# Patient Record
Sex: MALE | NOT HISPANIC OR LATINO | Employment: FULL TIME | URBAN - METROPOLITAN AREA
[De-identification: names, ages, dates, MRNs, and addresses within clinical notes are randomized per-mention and may not be internally consistent; named-entity substitution may affect disease eponyms.]

---

## 2018-09-13 ENCOUNTER — OFFICE VISIT (OUTPATIENT)
Dept: CARDIOLOGY | Facility: CLINIC | Age: 33
End: 2018-09-13

## 2018-09-13 VITALS
WEIGHT: 182 LBS | HEIGHT: 67 IN | HEART RATE: 71 BPM | BODY MASS INDEX: 28.56 KG/M2 | DIASTOLIC BLOOD PRESSURE: 78 MMHG | SYSTOLIC BLOOD PRESSURE: 116 MMHG

## 2018-09-13 DIAGNOSIS — E78.5 HYPERLIPIDEMIA, UNSPECIFIED HYPERLIPIDEMIA TYPE: ICD-10-CM

## 2018-09-13 DIAGNOSIS — R07.2 PRECORDIAL PAIN: Primary | ICD-10-CM

## 2018-09-13 DIAGNOSIS — R94.31 ABNORMAL EKG: ICD-10-CM

## 2018-09-13 DIAGNOSIS — R53.83 FATIGUE, UNSPECIFIED TYPE: ICD-10-CM

## 2018-09-13 PROCEDURE — 99203 OFFICE O/P NEW LOW 30 MIN: CPT | Performed by: INTERNAL MEDICINE

## 2018-09-13 NOTE — PROGRESS NOTES
" Subjective:        CC  CP  WAKE UP IN MIDDLE OF NIGHT, VOMITTING, AFTER ALCOHOL, PALPITATION    WHOLE BOTTLE OF WINE         Rik Mckinley is a 32 y.o. male who is here for the cardiac evaluation as well as establishment of care as the patient is complaining of the chest pain retrosternal recently  woke up in middle of night associated with the vomiting after the alcohol consumption associated with palpitations and also    The chest as well as moderate in intensity    Patient had consumed a whole bottle of wine Cardiac risk factors: family history of premature cardiovascular disease, obesity (BMI >= 30 kg/m2) and smoking/ tobacco exposure.    Past Medical History:   Diagnosis Date   • Hyperlipidemia     reports that he has been smoking Cigars.  He has smoked for the past 2.00 years. He has never used smokeless tobacco. He reports that he drinks alcohol. He reports that he does not use drugs.  Family History   Problem Relation Age of Onset   • Hypertension Father    • Hyperlipidemia Father    • Heart failure Maternal Grandfather    • Heart attack Maternal Grandfather    • Heart disease Maternal Grandfather         Review of Systems  Constitutional: No wt loss, fever   Gastrointestinal: No nausea , abdominal pain  Behavioral/Psych: No insomnia or anxiety   Cardiovascular ----positive for CP. All other systems reviewed and are negative    Objective:                 Physical Exam  /78   Pulse 71   Ht 170.2 cm (67\")   Wt 82.6 kg (182 lb)   BMI 28.51 kg/m²     General appearance: NAD, conversant   Eyes: anicteric sclerae, moist conjunctivae; no lid-lag; PERRLA   HENT: Atraumatic; oropharynx clear with moist mucous membranes and no mucosal ulcerations;  normal hard and soft palate   Neck: Trachea midline; FROM, supple, no thyromegaly or lymphadenopathy   Lungs: CTA, with normal respiratory effort and no intercostal retractions   CV: S1-S2 regular, no murmurs, no rub, no gallop   Abdomen: Soft, non-tender; no " masses or HSM   Extremities: No peripheral edema or extremity lymphadenopathy  Skin: Normal temperature, turgor and texture; no rash, ulcers or subcutaneous nodules   Psych: Appropriate affect, alert and oriented to person, place and time           Cardiographics  @Procedures    Echocardiogram:     Imaging  Chest x-ray: not done     Lab Review   not applicable       Current Outpatient Prescriptions:   •  metFORMIN (GLUCOPHAGE) 500 MG tablet, Take 500 mg by mouth Daily., Disp: , Rfl:         Assessment:      1. Precordial pain    2. Abnormal EKG    3. Fatigue, unspecified type    4. Hyperlipidemia, unspecified hyperlipidemia type        There is no problem list on file for this patient.        Plan:          1. Precordial pain  Considering the patient's symptoms as well as clinical situation and  EKG findings, along with cardiac risk factors, ischemic workup is necessary to rule out ischemic cardiomyopathy, stress induced arrhythmias, and functional capacity for diagnosis as well as prognostic consideration    - Treadmill Stress Test  - Adult Transthoracic Echo Complete W/ Cont if Necessary Per Protocol; Future  - CBC & Differential  - Comprehensive Metabolic Panel  - Lipid Panel  - Thyroid Panel With TSH  - Vitamin D 25 Hydroxy  - CBC Auto Differential    2. Abnormal EKG  Considering patient's medical condition as well as the risk factors, patient will require echocardiogram for further evaluation for the LV function, four-chamber evaluation, including the pressures, valvular function and  pericardial disease and pericardial effusion    - Treadmill Stress Test  - Adult Transthoracic Echo Complete W/ Cont if Necessary Per Protocol; Future  - CBC & Differential  - Comprehensive Metabolic Panel  - Lipid Panel  - Thyroid Panel With TSH  - Vitamin D 25 Hydroxy  - CBC Auto Differential    3. Fatigue, unspecified type  Multifactorial  - Treadmill Stress Test  - Adult Transthoracic Echo Complete W/ Cont if Necessary Per  Protocol; Future  - CBC & Differential  - Comprehensive Metabolic Panel  - Lipid Panel  - Thyroid Panel With TSH  - Vitamin D 25 Hydroxy  - CBC Auto Differential    4. Hyperlipidemia, unspecified hyperlipidemia type  Counseling has been done  - Treadmill Stress Test  - Adult Transthoracic Echo Complete W/ Cont if Necessary Per Protocol; Future  - CBC & Differential  - Comprehensive Metabolic Panel  - Lipid Panel  - Thyroid Panel With TSH  - Vitamin D 25 Hydroxy  - CBC Auto Differential      ETT/ ECHO, CBC, CMP, FLP, TSH    SEE IN 2-4 WKS        Counseling was given to Rik Mckinley for the following topics:  risk factor reductions, prognosis and risks and benefits of treatment options .       SMOKING COUNSELING:    Ready to quit: Not Answered  Counseling given: Yes      .  EMR Dragon/Transcription disclaimer:   Much of this encounter note is an electronic transcription/translation of spoken language to printed text. The electronic translation of spoken language may permit erroneous, or at times, nonsensical words or phrases to be inadvertently transcribed; Although I have reviewed the note for such errors, some may still exist.

## 2018-09-19 ENCOUNTER — HOSPITAL ENCOUNTER (OUTPATIENT)
Dept: CARDIOLOGY | Facility: HOSPITAL | Age: 33
Discharge: HOME | End: 2018-09-19
Attending: INTERNAL MEDICINE | Admitting: INTERNAL MEDICINE

## 2018-09-19 VITALS
HEART RATE: 85 BPM | BODY MASS INDEX: 28.56 KG/M2 | HEIGHT: 67 IN | SYSTOLIC BLOOD PRESSURE: 106 MMHG | WEIGHT: 182 LBS | DIASTOLIC BLOOD PRESSURE: 68 MMHG

## 2018-09-19 LAB
BH CV STRESS BP STAGE 1: NORMAL
BH CV STRESS BP STAGE 2: NORMAL
BH CV STRESS BP STAGE 3: NORMAL
BH CV STRESS DURATION MIN STAGE 1: 3
BH CV STRESS DURATION MIN STAGE 2: 3
BH CV STRESS DURATION MIN STAGE 3: 3
BH CV STRESS DURATION MIN STAGE 4: 1
BH CV STRESS DURATION SEC STAGE 1: 0
BH CV STRESS DURATION SEC STAGE 2: 0
BH CV STRESS DURATION SEC STAGE 3: 0
BH CV STRESS DURATION SEC STAGE 4: 30
BH CV STRESS GRADE STAGE 1: 10
BH CV STRESS GRADE STAGE 2: 12
BH CV STRESS GRADE STAGE 3: 14
BH CV STRESS GRADE STAGE 4: 16
BH CV STRESS HR STAGE 1: 107
BH CV STRESS HR STAGE 2: 138
BH CV STRESS HR STAGE 3: 164
BH CV STRESS HR STAGE 4: 183
BH CV STRESS METS STAGE 1: 4.6
BH CV STRESS METS STAGE 2: 7.1
BH CV STRESS METS STAGE 3: 10.2
BH CV STRESS METS STAGE 4: 11.1
BH CV STRESS PROTOCOL 1: NORMAL
BH CV STRESS RECOVERY BP: NORMAL MMHG
BH CV STRESS RECOVERY HR: 96 BPM
BH CV STRESS SPEED STAGE 1: 1.7
BH CV STRESS SPEED STAGE 2: 2.5
BH CV STRESS SPEED STAGE 3: 3.4
BH CV STRESS SPEED STAGE 4: 4.2
BH CV STRESS STAGE 1: 1
BH CV STRESS STAGE 2: 2
BH CV STRESS STAGE 3: 3
BH CV STRESS STAGE 4: 4
MAXIMAL PREDICTED HEART RATE: 188 BPM
PERCENT MAX PREDICTED HR: 98.4 %
STRESS BASELINE BP: NORMAL MMHG
STRESS BASELINE HR: 73 BPM
STRESS PERCENT HR: 116 %
STRESS POST ESTIMATED WORKLOAD: 11.2 METS
STRESS POST EXERCISE DUR MIN: 10 MIN
STRESS POST EXERCISE DUR SEC: 30 SEC
STRESS POST PEAK BP: NORMAL MMHG
STRESS POST PEAK HR: 185 BPM
STRESS TARGET HR: 160 BPM

## 2018-09-19 PROCEDURE — 93018 CV STRESS TEST I&R ONLY: CPT | Performed by: INTERNAL MEDICINE

## 2018-09-19 PROCEDURE — 93017 CV STRESS TEST TRACING ONLY: CPT

## 2018-09-19 PROCEDURE — 93016 CV STRESS TEST SUPVJ ONLY: CPT | Performed by: INTERNAL MEDICINE

## 2018-09-21 ENCOUNTER — HOSPITAL ENCOUNTER (OUTPATIENT)
Dept: CARDIOLOGY | Facility: HOSPITAL | Age: 33
Discharge: HOME | End: 2018-09-21
Attending: INTERNAL MEDICINE | Admitting: INTERNAL MEDICINE

## 2018-09-21 ENCOUNTER — HOSPITAL ENCOUNTER (OUTPATIENT)
Dept: CARDIOLOGY | Facility: HOSPITAL | Age: 33
Discharge: HOME | End: 2018-09-21

## 2018-09-21 VITALS
HEART RATE: 62 BPM | WEIGHT: 182 LBS | HEIGHT: 67 IN | SYSTOLIC BLOOD PRESSURE: 116 MMHG | BODY MASS INDEX: 28.56 KG/M2 | DIASTOLIC BLOOD PRESSURE: 77 MMHG

## 2018-09-21 DIAGNOSIS — R07.2 PRECORDIAL PAIN: ICD-10-CM

## 2018-09-21 DIAGNOSIS — R53.83 FATIGUE, UNSPECIFIED TYPE: ICD-10-CM

## 2018-09-21 DIAGNOSIS — E78.5 HYPERLIPIDEMIA, UNSPECIFIED HYPERLIPIDEMIA TYPE: ICD-10-CM

## 2018-09-21 DIAGNOSIS — R94.31 ABNORMAL EKG: ICD-10-CM

## 2018-09-21 LAB
25(OH)D3 SERPL-MCNC: 19.4 NG/ML (ref 30–100)
ALBUMIN SERPL-MCNC: 4.6 G/DL (ref 3.5–5.2)
ALBUMIN/GLOB SERPL: 1.6 G/DL
ALP SERPL-CCNC: 56 U/L (ref 39–117)
ALT SERPL W P-5'-P-CCNC: 40 U/L (ref 1–41)
ANION GAP SERPL CALCULATED.3IONS-SCNC: 8.6 MMOL/L
AORTIC DIMENSIONLESS INDEX: 0.8 (DI)
AST SERPL-CCNC: 24 U/L (ref 1–40)
BASOPHILS # BLD AUTO: 0.01 10*3/MM3 (ref 0–0.2)
BASOPHILS NFR BLD AUTO: 0.2 % (ref 0–1.5)
BH CV ECHO MEAS - ACS: 2.4 CM
BH CV ECHO MEAS - AO MAX PG (FULL): 0.35 MMHG
BH CV ECHO MEAS - AO MAX PG: 5 MMHG
BH CV ECHO MEAS - AO MEAN PG (FULL): 1 MMHG
BH CV ECHO MEAS - AO MEAN PG: 3 MMHG
BH CV ECHO MEAS - AO ROOT AREA (BSA CORRECTED): 1.6
BH CV ECHO MEAS - AO ROOT AREA: 7.5 CM^2
BH CV ECHO MEAS - AO ROOT DIAM: 3.1 CM
BH CV ECHO MEAS - AO V2 MAX: 112 CM/SEC
BH CV ECHO MEAS - AO V2 MEAN: 73.7 CM/SEC
BH CV ECHO MEAS - AO V2 VTI: 25.3 CM
BH CV ECHO MEAS - ASC AORTA: 2.4 CM
BH CV ECHO MEAS - AVA(I,A): 3.2 CM^2
BH CV ECHO MEAS - AVA(I,D): 3.2 CM^2
BH CV ECHO MEAS - AVA(V,A): 4 CM^2
BH CV ECHO MEAS - AVA(V,D): 4 CM^2
BH CV ECHO MEAS - BSA(HAYCOCK): 2 M^2
BH CV ECHO MEAS - BSA: 1.9 M^2
BH CV ECHO MEAS - BZI_BMI: 28.5 KILOGRAMS/M^2
BH CV ECHO MEAS - BZI_METRIC_HEIGHT: 170.2 CM
BH CV ECHO MEAS - BZI_METRIC_WEIGHT: 82.6 KG
BH CV ECHO MEAS - EDV(CUBED): 103.8 ML
BH CV ECHO MEAS - EDV(MOD-SP2): 106 ML
BH CV ECHO MEAS - EDV(MOD-SP4): 87 ML
BH CV ECHO MEAS - EDV(TEICH): 102.4 ML
BH CV ECHO MEAS - EF(CUBED): 68.4 %
BH CV ECHO MEAS - EF(MOD-BP): 74 %
BH CV ECHO MEAS - EF(MOD-SP2): 70.8 %
BH CV ECHO MEAS - EF(MOD-SP4): 74.7 %
BH CV ECHO MEAS - EF(TEICH): 60 %
BH CV ECHO MEAS - ESV(CUBED): 32.8 ML
BH CV ECHO MEAS - ESV(MOD-SP2): 31 ML
BH CV ECHO MEAS - ESV(MOD-SP4): 22 ML
BH CV ECHO MEAS - ESV(TEICH): 41 ML
BH CV ECHO MEAS - FS: 31.9 %
BH CV ECHO MEAS - IVS/LVPW: 0.89
BH CV ECHO MEAS - IVSD: 0.8 CM
BH CV ECHO MEAS - LAT PEAK E' VEL: 15 CM/SEC
BH CV ECHO MEAS - LV DIASTOLIC VOL/BSA (35-75): 44.8 ML/M^2
BH CV ECHO MEAS - LV MASS(C)D: 132.3 GRAMS
BH CV ECHO MEAS - LV MASS(C)DI: 68.1 GRAMS/M^2
BH CV ECHO MEAS - LV MAX PG: 4.7 MMHG
BH CV ECHO MEAS - LV MEAN PG: 2 MMHG
BH CV ECHO MEAS - LV SYSTOLIC VOL/BSA (12-30): 11.3 ML/M^2
BH CV ECHO MEAS - LV V1 MAX: 108 CM/SEC
BH CV ECHO MEAS - LV V1 MEAN: 68.4 CM/SEC
BH CV ECHO MEAS - LV V1 VTI: 19.7 CM
BH CV ECHO MEAS - LVIDD: 4.7 CM
BH CV ECHO MEAS - LVIDS: 3.2 CM
BH CV ECHO MEAS - LVLD AP2: 8.1 CM
BH CV ECHO MEAS - LVLD AP4: 8.6 CM
BH CV ECHO MEAS - LVLS AP2: 6.6 CM
BH CV ECHO MEAS - LVLS AP4: 6.5 CM
BH CV ECHO MEAS - LVOT AREA (M): 4.2 CM^2
BH CV ECHO MEAS - LVOT AREA: 4.2 CM^2
BH CV ECHO MEAS - LVOT DIAM: 2.3 CM
BH CV ECHO MEAS - LVPWD: 0.9 CM
BH CV ECHO MEAS - MED PEAK E' VEL: 11 CM/SEC
BH CV ECHO MEAS - MV A DUR: 0.14 SEC
BH CV ECHO MEAS - MV A MAX VEL: 48.5 CM/SEC
BH CV ECHO MEAS - MV DEC SLOPE: 297 CM/SEC^2
BH CV ECHO MEAS - MV DEC TIME: 0.15 SEC
BH CV ECHO MEAS - MV E MAX VEL: 76.2 CM/SEC
BH CV ECHO MEAS - MV E/A: 1.6
BH CV ECHO MEAS - MV MAX PG: 1.9 MMHG
BH CV ECHO MEAS - MV MEAN PG: 1 MMHG
BH CV ECHO MEAS - MV P1/2T MAX VEL: 77.4 CM/SEC
BH CV ECHO MEAS - MV P1/2T: 76.3 MSEC
BH CV ECHO MEAS - MV V2 MAX: 68.9 CM/SEC
BH CV ECHO MEAS - MV V2 MEAN: 38.6 CM/SEC
BH CV ECHO MEAS - MV V2 VTI: 21.1 CM
BH CV ECHO MEAS - MVA P1/2T LCG: 2.8 CM^2
BH CV ECHO MEAS - MVA(P1/2T): 2.9 CM^2
BH CV ECHO MEAS - MVA(VTI): 3.9 CM^2
BH CV ECHO MEAS - PA ACC TIME: 0.12 SEC
BH CV ECHO MEAS - PA MAX PG (FULL): 1.8 MMHG
BH CV ECHO MEAS - PA MAX PG: 3.8 MMHG
BH CV ECHO MEAS - PA PR(ACCEL): 23.7 MMHG
BH CV ECHO MEAS - PA V2 MAX: 97.7 CM/SEC
BH CV ECHO MEAS - PULM A REVS DUR: 0.11 SEC
BH CV ECHO MEAS - PULM A REVS VEL: 22.2 CM/SEC
BH CV ECHO MEAS - PULM DIAS VEL: 43 CM/SEC
BH CV ECHO MEAS - PULM S/D: 1
BH CV ECHO MEAS - PULM SYS VEL: 43.5 CM/SEC
BH CV ECHO MEAS - PVA(V,A): 3 CM^2
BH CV ECHO MEAS - PVA(V,D): 3 CM^2
BH CV ECHO MEAS - QP/QS: 0.89
BH CV ECHO MEAS - RAP SYSTOLE: 3 MMHG
BH CV ECHO MEAS - RV MAX PG: 2 MMHG
BH CV ECHO MEAS - RV MEAN PG: 1 MMHG
BH CV ECHO MEAS - RV V1 MAX: 70.8 CM/SEC
BH CV ECHO MEAS - RV V1 MEAN: 51.9 CM/SEC
BH CV ECHO MEAS - RV V1 VTI: 17.6 CM
BH CV ECHO MEAS - RVOT AREA: 4.2 CM^2
BH CV ECHO MEAS - RVOT DIAM: 2.3 CM
BH CV ECHO MEAS - RVSP: 14.3 MMHG
BH CV ECHO MEAS - SI(AO): 98.3 ML/M^2
BH CV ECHO MEAS - SI(CUBED): 36.6 ML/M^2
BH CV ECHO MEAS - SI(LVOT): 42.1 ML/M^2
BH CV ECHO MEAS - SI(MOD-SP2): 38.6 ML/M^2
BH CV ECHO MEAS - SI(MOD-SP4): 33.5 ML/M^2
BH CV ECHO MEAS - SI(TEICH): 31.6 ML/M^2
BH CV ECHO MEAS - SV(AO): 191 ML
BH CV ECHO MEAS - SV(CUBED): 71.1 ML
BH CV ECHO MEAS - SV(LVOT): 81.8 ML
BH CV ECHO MEAS - SV(MOD-SP2): 75 ML
BH CV ECHO MEAS - SV(MOD-SP4): 65 ML
BH CV ECHO MEAS - SV(RVOT): 73.1 ML
BH CV ECHO MEAS - SV(TEICH): 61.4 ML
BH CV ECHO MEAS - TAPSE (>1.6): 2.3 CM2
BH CV ECHO MEAS - TR MAX VEL: 168 CM/SEC
BH CV ECHO MEASUREMENTS AVERAGE E/E' RATIO: 5.86
BH CV VAS BP RIGHT ARM: NORMAL MMHG
BH CV XLRA - RV BASE: 3.2 CM
BH CV XLRA - TDI S': 11 CM/SEC
BILIRUB SERPL-MCNC: 0.9 MG/DL (ref 0.1–1.2)
BUN BLD-MCNC: 15 MG/DL (ref 6–20)
BUN/CREAT SERPL: 15.2 (ref 7–25)
CALCIUM SPEC-SCNC: 9.5 MG/DL (ref 8.6–10.5)
CHLORIDE SERPL-SCNC: 102 MMOL/L (ref 98–107)
CHOLEST SERPL-MCNC: 156 MG/DL (ref 0–200)
CO2 SERPL-SCNC: 28.4 MMOL/L (ref 22–29)
CREAT BLD-MCNC: 0.99 MG/DL (ref 0.76–1.27)
DEPRECATED RDW RBC AUTO: 45.9 FL (ref 37–54)
EOSINOPHIL # BLD AUTO: 0.11 10*3/MM3 (ref 0–0.7)
EOSINOPHIL NFR BLD AUTO: 2 % (ref 0.3–6.2)
ERYTHROCYTE [DISTWIDTH] IN BLOOD BY AUTOMATED COUNT: 13.8 % (ref 11.5–14.5)
GFR SERPL CREATININE-BSD FRML MDRD: 106 ML/MIN/1.73
GFR SERPL CREATININE-BSD FRML MDRD: 88 ML/MIN/1.73
GLOBULIN UR ELPH-MCNC: 2.9 GM/DL
GLUCOSE BLD-MCNC: 94 MG/DL (ref 65–99)
HCT VFR BLD AUTO: 41.9 % (ref 40.4–52.2)
HDLC SERPL-MCNC: 54 MG/DL (ref 40–60)
HGB BLD-MCNC: 13.9 G/DL (ref 13.7–17.6)
IMM GRANULOCYTES # BLD: 0.01 10*3/MM3 (ref 0–0.03)
IMM GRANULOCYTES NFR BLD: 0.2 % (ref 0–0.5)
LDLC SERPL CALC-MCNC: 95 MG/DL (ref 0–100)
LDLC/HDLC SERPL: 1.75 {RATIO}
LEFT ATRIUM VOLUME INDEX: 18.7 ML/M2
LYMPHOCYTES # BLD AUTO: 2.16 10*3/MM3 (ref 0.9–4.8)
LYMPHOCYTES NFR BLD AUTO: 39.5 % (ref 19.6–45.3)
MAXIMAL PREDICTED HEART RATE: 188 BPM
MCH RBC QN AUTO: 30.3 PG (ref 27–32.7)
MCHC RBC AUTO-ENTMCNC: 33.2 G/DL (ref 32.6–36.4)
MCV RBC AUTO: 91.3 FL (ref 79.8–96.2)
MONOCYTES # BLD AUTO: 0.48 10*3/MM3 (ref 0.2–1.2)
MONOCYTES NFR BLD AUTO: 8.8 % (ref 5–12)
NEUTROPHILS # BLD AUTO: 2.71 10*3/MM3 (ref 1.9–8.1)
NEUTROPHILS NFR BLD AUTO: 49.5 % (ref 42.7–76)
PLATELET # BLD AUTO: 270 10*3/MM3 (ref 140–500)
PMV BLD AUTO: 10.8 FL (ref 6–12)
POTASSIUM BLD-SCNC: 4.8 MMOL/L (ref 3.5–5.2)
PROT SERPL-MCNC: 7.5 G/DL (ref 6–8.5)
RBC # BLD AUTO: 4.59 10*6/MM3 (ref 4.6–6)
SODIUM BLD-SCNC: 139 MMOL/L (ref 136–145)
STRESS TARGET HR: 160 BPM
T-UPTAKE NFR SERPL: 1.06 TBI (ref 0.8–1.3)
T4 SERPL-MCNC: 6.79 MCG/DL (ref 4.5–11.7)
TRIGL SERPL-MCNC: 37 MG/DL (ref 0–150)
TSH SERPL DL<=0.05 MIU/L-ACNC: 1.66 MIU/ML (ref 0.27–4.2)
VLDLC SERPL-MCNC: 7.4 MG/DL (ref 5–40)
WBC NRBC COR # BLD: 5.47 10*3/MM3 (ref 4.5–10.7)

## 2018-09-21 PROCEDURE — 84443 ASSAY THYROID STIM HORMONE: CPT | Performed by: INTERNAL MEDICINE

## 2018-09-21 PROCEDURE — 84479 ASSAY OF THYROID (T3 OR T4): CPT | Performed by: INTERNAL MEDICINE

## 2018-09-21 PROCEDURE — 84436 ASSAY OF TOTAL THYROXINE: CPT | Performed by: INTERNAL MEDICINE

## 2018-09-21 PROCEDURE — 80053 COMPREHEN METABOLIC PANEL: CPT | Performed by: INTERNAL MEDICINE

## 2018-09-21 PROCEDURE — 80061 LIPID PANEL: CPT | Performed by: INTERNAL MEDICINE

## 2018-09-21 PROCEDURE — 93306 TTE W/DOPPLER COMPLETE: CPT

## 2018-09-21 PROCEDURE — 36415 COLL VENOUS BLD VENIPUNCTURE: CPT | Performed by: INTERNAL MEDICINE

## 2018-09-21 PROCEDURE — 93306 TTE W/DOPPLER COMPLETE: CPT | Performed by: INTERNAL MEDICINE

## 2018-09-21 PROCEDURE — 82306 VITAMIN D 25 HYDROXY: CPT | Performed by: INTERNAL MEDICINE

## 2018-09-21 PROCEDURE — 85025 COMPLETE CBC W/AUTO DIFF WBC: CPT | Performed by: INTERNAL MEDICINE

## 2018-09-27 ENCOUNTER — OFFICE VISIT (OUTPATIENT)
Dept: CARDIOLOGY | Facility: CLINIC | Age: 33
End: 2018-09-27

## 2018-09-27 VITALS
DIASTOLIC BLOOD PRESSURE: 70 MMHG | HEART RATE: 70 BPM | WEIGHT: 184 LBS | BODY MASS INDEX: 28.88 KG/M2 | HEIGHT: 67 IN | SYSTOLIC BLOOD PRESSURE: 114 MMHG

## 2018-09-27 DIAGNOSIS — E78.5 HYPERLIPIDEMIA, UNSPECIFIED HYPERLIPIDEMIA TYPE: ICD-10-CM

## 2018-09-27 DIAGNOSIS — R07.2 PRECORDIAL PAIN: Primary | ICD-10-CM

## 2018-09-27 DIAGNOSIS — R94.31 ABNORMAL EKG: ICD-10-CM

## 2018-09-27 PROCEDURE — 99213 OFFICE O/P EST LOW 20 MIN: CPT | Performed by: INTERNAL MEDICINE

## 2018-09-27 NOTE — PROGRESS NOTES
" Subjective:        Rik Mckinley is a 33 y.o. male who here for follow up    CC  The follow-up after the stress test echo and the blood workup  HPI  33-year-old male with a known history of hyperlipidemia atypical and abnormal EKG underwent a stress test and echocardiogram here for the follow-up     Problem List Items Addressed This Visit        Cardiovascular and Mediastinum    Abnormal EKG    Hyperlipidemia       Nervous and Auditory    Precordial pain - Primary        .    The following portions of the patient's history were reviewed and updated as appropriate: allergies, current medications, past family history, past medical history, past social history, past surgical history and problem list.    Past Medical History:   Diagnosis Date   • Hyperlipidemia      reports that he has been smoking Cigars.  He has smoked for the past 2.00 years. He has never used smokeless tobacco. He reports that he drinks alcohol. He reports that he does not use drugs.   Family History   Problem Relation Age of Onset   • Hypertension Father    • Hyperlipidemia Father    • Heart failure Maternal Grandfather    • Heart attack Maternal Grandfather    • Heart disease Maternal Grandfather        Review of Systems  Constitutional: No wt loss, fever, fatigue  Gastrointestinal: No nausea, abdominal pain  Behavioral/Psych: No insomnia or anxiety   Cardiovascular no chest pains or tightness in chest  Objective:                 Physical Exam  /70   Pulse 70   Ht 170.2 cm (67\")   Wt 83.5 kg (184 lb)   BMI 28.82 kg/m²     General appearance: NAD, conversant   Eyes: anicteric sclerae, moist conjunctivae; no lid-lag; PERRLA   HENT: Atraumatic; oropharynx clear with moist mucous membranes and no mucosal ulcerations;  normal hard and soft palate   Neck: Trachea midline; FROM, supple, no thyromegaly or lymphadenopathy   Lungs: CTA, with normal respiratory effort and no intercostal retractions   CV: S1-S2 regular, no murmurs, no rub, no gallop "   Abdomen: Soft, non-tender; no masses or HSM   Extremities: No peripheral edema or extremity lymphadenopathy  Skin: Normal temperature, turgor and texture; no rash, ulcers or subcutaneous nodules   Psych: Appropriate affect, alert and oriented to person, place and time           Cardiographics  @Procedures    Echocardiogram:        Current Outpatient Prescriptions:   •  metFORMIN (GLUCOPHAGE) 500 MG tablet, Take 500 mg by mouth Daily., Disp: , Rfl:    Assessment:        There is no problem list on file for this patient.    · No ECG evidence of myocardial ischemia.Negative clinical evidence of myocardial ischemia. Findings consistent with a normal ECG stress test  · Asymptomatic for chest pain. ECG is negative for ischemia.       Interpretation Summary     · Calculated EF = 74%.  · Trace-to-mild mitral valve regurgitation is present  · There is no evidence of pericardial effusion.         Ref Range & Units 6d ago   Total Cholesterol 0 - 200 mg/dL 156    Triglycerides 0 - 150 mg/dL 37    HDL Cholesterol 40 - 60 mg/dL 54    LDL Cholesterol  0 - 100 mg/dL 95    VLDL Cholesterol 5 - 40 mg/dL 7.4    LDL/HDL Ratio  1.75      WBC 4.50 - 10.70 10*3/mm3 5.47    RBC 4.60 - 6.00 10*6/mm3 4.59     Hemoglobin 13.7 - 17.6 g/dL 13.9    Hematocrit 40.4 - 52.2 % 41.9    MCV 79.8 - 96.2 fL 91.3    MCH 27.0 - 32.7 pg 30.3    MCHC 32.6 - 36.4 g/dL 33.2    RDW 11.5 - 14.5 % 13.8    RDW-SD 37.0 - 54.0 fl 45.9    MPV 6.0 - 12.0 fL 10.8    Platelets 140 - 500 10*3/mm3 270    Neutrophil % 42.7 - 76.0 % 49.5    Lymphocyte % 19.6 - 45.3 % 39.5    Monocyte % 5.0 - 12.0 % 8.8    Eosinophil % 0.3 - 6.2 % 2.0    Basophil % 0.0 - 1.5 % 0.2    Immature Grans % 0.0 - 0.5 % 0.2    Neutrophils, Absolute 1.90 - 8.10 10*3/mm3 2.71    Lymphocytes, Absolute 0.90 - 4.80 10*3/mm3 2.16    Monocytes, Absolute 0.20 - 1.20 10*3/mm3 0.     Glucose 65 - 99 mg/dL 94    BUN 6 - 20 mg/dL 15    Creatinine 0.76 - 1.27 mg/dL 0.99    Sodium 136 - 145 mmol/L 139     Potassium 3.5 - 5.2 mmol/L 4.8    Chloride 98 - 107 mmol/L 102    CO2 22.0 - 29.0 mmol/L 28.4    Calcium 8.6 - 10.5 mg/dL 9.5    Total Protein 6.0 - 8.5 g/dL 7.5    Albumin 3.50 - 5.20 g/dL 4.60    ALT (SGPT) 1 - 41 U/L 40    AST (SGOT) 1 - 40 U/L 24    Alkaline Phosphatase 39 - 117 U/L 56    Total Bilirubin 0.1 - 1.2 mg/dL 0.9    eGFR Non African Amer >60 mL/min/1.73 88    eGFR  African Amer >60 mL/min/1.73 106    Globulin gm/dL 2.9    A/G Ratio g/dL 1.6    BUN/Creatinine Ratio 7.0 - 25.0 15.2      Ref Range & Units 6d ago   TSH 0.270 - 4.200 mIU/mL 1.660    T Uptake 0.80 - 1.30 TBI 1.06    T4, Total 4.50 - 11.70 mcg/dL 6.79       Ref Range & Units 6d ago   25 Hydroxy, Vitamin D 30.0 - 100.0 ng/ml 19.4            Plan:            ICD-10-CM ICD-9-CM   1. Precordial pain R07.2 786.51   2. Abnormal EKG R94.31 794.31   3. Hyperlipidemia, unspecified hyperlipidemia type E78.5 272.4     1. Precordial pain  Workup has been negative    2. Abnormal EKG  Workup has been negative    3. Hyperlipidemia, unspecified hyperlipidemia type  Risk of the hyperlipidemia, importance of the treatment has been explained    Pros and cons of the antilipemic drugs has been explained    Regular blood workup as well as side effects including the liver failure, myelopathy death has been explained        COUNSELING:    Rik Victoria was given to patient for the following topics: diagnostic results, risk factor reductions, impressions, risks and benefits of treatment options and importance of treatment compliance .       SMOKING COUNSELING:    Ready to quit: No  Counseling given: Yes      EMR Dragon/Transcription disclaimer:   Much of this encounter note is an electronic transcription/translation of spoken language to printed text. The electronic translation of spoken language may permit erroneous, or at times, nonsensical words or phrases to be inadvertently transcribed; Although I have reviewed the note for such errors, some may  still exist.

## 2018-09-29 PROBLEM — R07.2 PRECORDIAL PAIN: Status: ACTIVE | Noted: 2018-09-29

## 2018-09-29 PROBLEM — E78.5 HYPERLIPIDEMIA: Status: ACTIVE | Noted: 2018-09-29

## 2018-09-29 PROBLEM — R94.31 ABNORMAL EKG: Status: ACTIVE | Noted: 2018-09-29

## 2021-04-16 ENCOUNTER — BULK ORDERING (OUTPATIENT)
Dept: CASE MANAGEMENT | Facility: OTHER | Age: 36
End: 2021-04-16

## 2021-04-16 DIAGNOSIS — Z23 IMMUNIZATION DUE: ICD-10-CM

## 2024-04-24 ENCOUNTER — APPOINTMENT (OUTPATIENT)
Dept: MRI IMAGING | Facility: HOSPITAL | Age: 39
End: 2024-04-24
Payer: COMMERCIAL

## 2024-04-24 ENCOUNTER — HOSPITAL ENCOUNTER (OUTPATIENT)
Facility: HOSPITAL | Age: 39
Setting detail: OBSERVATION
Discharge: HOME | End: 2024-04-25
Attending: EMERGENCY MEDICINE | Admitting: EMERGENCY MEDICINE
Payer: COMMERCIAL

## 2024-04-24 ENCOUNTER — APPOINTMENT (OUTPATIENT)
Dept: CT IMAGING | Facility: HOSPITAL | Age: 39
End: 2024-04-24
Payer: COMMERCIAL

## 2024-04-24 DIAGNOSIS — R20.0 RIGHT SIDED NUMBNESS: Primary | ICD-10-CM

## 2024-04-24 PROBLEM — R20.2 PARESTHESIA OF RIGHT UPPER AND LOWER EXTREMITY: Status: ACTIVE | Noted: 2024-04-24

## 2024-04-24 PROBLEM — R20.2 PARESTHESIA OF RIGHT UPPER AND LOWER EXTREMITY: Status: RESOLVED | Noted: 2024-04-24 | Resolved: 2024-04-24

## 2024-04-24 LAB
ALBUMIN SERPL-MCNC: 4.6 G/DL (ref 3.5–5.2)
ALBUMIN/GLOB SERPL: 1.5 G/DL
ALP SERPL-CCNC: 76 U/L (ref 39–117)
ALT SERPL W P-5'-P-CCNC: 29 U/L (ref 1–41)
ANION GAP SERPL CALCULATED.3IONS-SCNC: 12 MMOL/L (ref 5–15)
AST SERPL-CCNC: 21 U/L (ref 1–40)
BASOPHILS # BLD AUTO: 0.02 10*3/MM3 (ref 0–0.2)
BASOPHILS NFR BLD AUTO: 0.3 % (ref 0–1.5)
BILIRUB SERPL-MCNC: 0.5 MG/DL (ref 0–1.2)
BUN SERPL-MCNC: 11 MG/DL (ref 6–20)
BUN/CREAT SERPL: 10.8 (ref 7–25)
CALCIUM SPEC-SCNC: 9 MG/DL (ref 8.6–10.5)
CHLORIDE SERPL-SCNC: 105 MMOL/L (ref 98–107)
CHOLEST SERPL-MCNC: 159 MG/DL (ref 0–200)
CO2 SERPL-SCNC: 25 MMOL/L (ref 22–29)
CREAT SERPL-MCNC: 1.02 MG/DL (ref 0.76–1.27)
DEPRECATED RDW RBC AUTO: 45.8 FL (ref 37–54)
EGFRCR SERPLBLD CKD-EPI 2021: 96.5 ML/MIN/1.73
EOSINOPHIL # BLD AUTO: 0.02 10*3/MM3 (ref 0–0.4)
EOSINOPHIL NFR BLD AUTO: 0.3 % (ref 0.3–6.2)
ERYTHROCYTE [DISTWIDTH] IN BLOOD BY AUTOMATED COUNT: 13.5 % (ref 12.3–15.4)
GLOBULIN UR ELPH-MCNC: 3 GM/DL
GLUCOSE SERPL-MCNC: 93 MG/DL (ref 65–99)
HBA1C MFR BLD: 5.7 % (ref 4.8–5.6)
HCT VFR BLD AUTO: 41.5 % (ref 37.5–51)
HDLC SERPL-MCNC: 50 MG/DL (ref 40–60)
HGB BLD-MCNC: 13.4 G/DL (ref 13–17.7)
IMM GRANULOCYTES # BLD AUTO: 0.03 10*3/MM3 (ref 0–0.05)
IMM GRANULOCYTES NFR BLD AUTO: 0.4 % (ref 0–0.5)
INR PPP: 1.12 (ref 0.9–1.1)
LDLC SERPL CALC-MCNC: 96 MG/DL (ref 0–100)
LDLC/HDLC SERPL: 1.93 {RATIO}
LYMPHOCYTES # BLD AUTO: 2.52 10*3/MM3 (ref 0.7–3.1)
LYMPHOCYTES NFR BLD AUTO: 36.6 % (ref 19.6–45.3)
MCH RBC QN AUTO: 29.6 PG (ref 26.6–33)
MCHC RBC AUTO-ENTMCNC: 32.3 G/DL (ref 31.5–35.7)
MCV RBC AUTO: 91.6 FL (ref 79–97)
MONOCYTES # BLD AUTO: 0.65 10*3/MM3 (ref 0.1–0.9)
MONOCYTES NFR BLD AUTO: 9.4 % (ref 5–12)
NEUTROPHILS NFR BLD AUTO: 3.65 10*3/MM3 (ref 1.7–7)
NEUTROPHILS NFR BLD AUTO: 53 % (ref 42.7–76)
NRBC BLD AUTO-RTO: 0 /100 WBC (ref 0–0.2)
PLATELET # BLD AUTO: 274 10*3/MM3 (ref 140–450)
PMV BLD AUTO: 9.7 FL (ref 6–12)
POTASSIUM SERPL-SCNC: 3.9 MMOL/L (ref 3.5–5.2)
PROT SERPL-MCNC: 7.6 G/DL (ref 6–8.5)
PROTHROMBIN TIME: 14.6 SECONDS (ref 11.7–14.2)
RBC # BLD AUTO: 4.53 10*6/MM3 (ref 4.14–5.8)
SODIUM SERPL-SCNC: 142 MMOL/L (ref 136–145)
TRIGL SERPL-MCNC: 63 MG/DL (ref 0–150)
TROPONIN T SERPL HS-MCNC: 6 NG/L
TSH SERPL DL<=0.05 MIU/L-ACNC: 2.14 UIU/ML (ref 0.27–4.2)
VLDLC SERPL-MCNC: 13 MG/DL (ref 5–40)
WBC NRBC COR # BLD AUTO: 6.89 10*3/MM3 (ref 3.4–10.8)

## 2024-04-24 PROCEDURE — G0378 HOSPITAL OBSERVATION PER HR: HCPCS

## 2024-04-24 PROCEDURE — 84443 ASSAY THYROID STIM HORMONE: CPT | Performed by: STUDENT IN AN ORGANIZED HEALTH CARE EDUCATION/TRAINING PROGRAM

## 2024-04-24 PROCEDURE — 99285 EMERGENCY DEPT VISIT HI MDM: CPT

## 2024-04-24 PROCEDURE — 93010 ELECTROCARDIOGRAM REPORT: CPT | Performed by: INTERNAL MEDICINE

## 2024-04-24 PROCEDURE — 85610 PROTHROMBIN TIME: CPT | Performed by: EMERGENCY MEDICINE

## 2024-04-24 PROCEDURE — 70551 MRI BRAIN STEM W/O DYE: CPT

## 2024-04-24 PROCEDURE — 80053 COMPREHEN METABOLIC PANEL: CPT | Performed by: EMERGENCY MEDICINE

## 2024-04-24 PROCEDURE — 80061 LIPID PANEL: CPT | Performed by: STUDENT IN AN ORGANIZED HEALTH CARE EDUCATION/TRAINING PROGRAM

## 2024-04-24 PROCEDURE — 70450 CT HEAD/BRAIN W/O DYE: CPT

## 2024-04-24 PROCEDURE — 84484 ASSAY OF TROPONIN QUANT: CPT | Performed by: EMERGENCY MEDICINE

## 2024-04-24 PROCEDURE — 83036 HEMOGLOBIN GLYCOSYLATED A1C: CPT | Performed by: STUDENT IN AN ORGANIZED HEALTH CARE EDUCATION/TRAINING PROGRAM

## 2024-04-24 PROCEDURE — 93005 ELECTROCARDIOGRAM TRACING: CPT | Performed by: EMERGENCY MEDICINE

## 2024-04-24 PROCEDURE — 85025 COMPLETE CBC W/AUTO DIFF WBC: CPT | Performed by: EMERGENCY MEDICINE

## 2024-04-24 RX ORDER — ACETAMINOPHEN 325 MG/1
650 TABLET ORAL EVERY 4 HOURS PRN
Status: DISCONTINUED | OUTPATIENT
Start: 2024-04-24 | End: 2024-04-25 | Stop reason: HOSPADM

## 2024-04-24 RX ORDER — SODIUM CHLORIDE 0.9 % (FLUSH) 0.9 %
10 SYRINGE (ML) INJECTION AS NEEDED
Status: DISCONTINUED | OUTPATIENT
Start: 2024-04-24 | End: 2024-04-25 | Stop reason: HOSPADM

## 2024-04-24 RX ORDER — NITROGLYCERIN 0.4 MG/1
0.4 TABLET SUBLINGUAL
Status: DISCONTINUED | OUTPATIENT
Start: 2024-04-24 | End: 2024-04-25 | Stop reason: HOSPADM

## 2024-04-24 RX ORDER — BUPROPION HYDROCHLORIDE 150 MG/1
300 TABLET ORAL DAILY
Status: DISCONTINUED | OUTPATIENT
Start: 2024-04-25 | End: 2024-04-25 | Stop reason: HOSPADM

## 2024-04-24 RX ORDER — SODIUM CHLORIDE 9 MG/ML
40 INJECTION, SOLUTION INTRAVENOUS AS NEEDED
Status: DISCONTINUED | OUTPATIENT
Start: 2024-04-24 | End: 2024-04-25 | Stop reason: HOSPADM

## 2024-04-24 RX ORDER — ASPIRIN 325 MG
325 TABLET ORAL ONCE
Status: COMPLETED | OUTPATIENT
Start: 2024-04-24 | End: 2024-04-24

## 2024-04-24 RX ORDER — SODIUM CHLORIDE 0.9 % (FLUSH) 0.9 %
10 SYRINGE (ML) INJECTION EVERY 12 HOURS SCHEDULED
Status: DISCONTINUED | OUTPATIENT
Start: 2024-04-24 | End: 2024-04-25 | Stop reason: HOSPADM

## 2024-04-24 RX ORDER — ONDANSETRON 2 MG/ML
4 INJECTION INTRAMUSCULAR; INTRAVENOUS EVERY 6 HOURS PRN
Status: DISCONTINUED | OUTPATIENT
Start: 2024-04-24 | End: 2024-04-25 | Stop reason: HOSPADM

## 2024-04-24 RX ORDER — BUPROPION HYDROCHLORIDE 150 MG/1
300 TABLET ORAL DAILY
COMMUNITY

## 2024-04-24 RX ORDER — ATORVASTATIN CALCIUM 20 MG/1
40 TABLET, FILM COATED ORAL NIGHTLY
Status: DISCONTINUED | OUTPATIENT
Start: 2024-04-24 | End: 2024-04-25 | Stop reason: HOSPADM

## 2024-04-24 RX ORDER — ONDANSETRON 4 MG/1
4 TABLET, ORALLY DISINTEGRATING ORAL EVERY 6 HOURS PRN
Status: DISCONTINUED | OUTPATIENT
Start: 2024-04-24 | End: 2024-04-25 | Stop reason: HOSPADM

## 2024-04-24 RX ADMIN — ATORVASTATIN CALCIUM 40 MG: 20 TABLET, FILM COATED ORAL at 22:29

## 2024-04-24 RX ADMIN — ASPIRIN 325 MG: 325 TABLET ORAL at 22:22

## 2024-04-25 VITALS
BODY MASS INDEX: 29.82 KG/M2 | RESPIRATION RATE: 16 BRPM | TEMPERATURE: 98.2 F | WEIGHT: 190 LBS | HEIGHT: 67 IN | OXYGEN SATURATION: 95 % | SYSTOLIC BLOOD PRESSURE: 118 MMHG | HEART RATE: 68 BPM | DIASTOLIC BLOOD PRESSURE: 82 MMHG

## 2024-04-25 PROBLEM — R20.0 RIGHT SIDED NUMBNESS: Status: RESOLVED | Noted: 2024-04-24 | Resolved: 2024-04-25

## 2024-04-25 LAB
ANION GAP SERPL CALCULATED.3IONS-SCNC: 10.8 MMOL/L (ref 5–15)
BUN SERPL-MCNC: 12 MG/DL (ref 6–20)
BUN/CREAT SERPL: 10.8 (ref 7–25)
CA-I BLD-MCNC: 5 MG/DL (ref 4.6–5.4)
CA-I SERPL ISE-MCNC: 1.25 MMOL/L (ref 1.15–1.35)
CALCIUM SPEC-SCNC: 8.6 MG/DL (ref 8.6–10.5)
CHLORIDE SERPL-SCNC: 108 MMOL/L (ref 98–107)
CO2 SERPL-SCNC: 23.2 MMOL/L (ref 22–29)
CREAT SERPL-MCNC: 1.11 MG/DL (ref 0.76–1.27)
DEPRECATED RDW RBC AUTO: 46.4 FL (ref 37–54)
EGFRCR SERPLBLD CKD-EPI 2021: 87.2 ML/MIN/1.73
ERYTHROCYTE [DISTWIDTH] IN BLOOD BY AUTOMATED COUNT: 13.6 % (ref 12.3–15.4)
FOLATE SERPL-MCNC: 8.38 NG/ML (ref 4.78–24.2)
GLUCOSE SERPL-MCNC: 102 MG/DL (ref 65–99)
HCT VFR BLD AUTO: 40 % (ref 37.5–51)
HGB BLD-MCNC: 13.1 G/DL (ref 13–17.7)
MCH RBC QN AUTO: 29.9 PG (ref 26.6–33)
MCHC RBC AUTO-ENTMCNC: 32.8 G/DL (ref 31.5–35.7)
MCV RBC AUTO: 91.3 FL (ref 79–97)
PLATELET # BLD AUTO: 264 10*3/MM3 (ref 140–450)
PMV BLD AUTO: 9.7 FL (ref 6–12)
POTASSIUM SERPL-SCNC: 3.9 MMOL/L (ref 3.5–5.2)
QT INTERVAL: 377 MS
QTC INTERVAL: 404 MS
RBC # BLD AUTO: 4.38 10*6/MM3 (ref 4.14–5.8)
SODIUM SERPL-SCNC: 142 MMOL/L (ref 136–145)
VIT B12 BLD-MCNC: 505 PG/ML (ref 211–946)
WBC NRBC COR # BLD AUTO: 5.74 10*3/MM3 (ref 3.4–10.8)

## 2024-04-25 PROCEDURE — 82746 ASSAY OF FOLIC ACID SERUM: CPT | Performed by: STUDENT IN AN ORGANIZED HEALTH CARE EDUCATION/TRAINING PROGRAM

## 2024-04-25 PROCEDURE — 80048 BASIC METABOLIC PNL TOTAL CA: CPT | Performed by: STUDENT IN AN ORGANIZED HEALTH CARE EDUCATION/TRAINING PROGRAM

## 2024-04-25 PROCEDURE — 82607 VITAMIN B-12: CPT | Performed by: STUDENT IN AN ORGANIZED HEALTH CARE EDUCATION/TRAINING PROGRAM

## 2024-04-25 PROCEDURE — 85027 COMPLETE CBC AUTOMATED: CPT | Performed by: STUDENT IN AN ORGANIZED HEALTH CARE EDUCATION/TRAINING PROGRAM

## 2024-04-25 PROCEDURE — 82330 ASSAY OF CALCIUM: CPT

## 2024-04-25 PROCEDURE — G0378 HOSPITAL OBSERVATION PER HR: HCPCS

## 2024-04-25 PROCEDURE — 99203 OFFICE O/P NEW LOW 30 MIN: CPT

## 2024-04-25 RX ADMIN — BUPROPION HYDROCHLORIDE 300 MG: 150 TABLET, EXTENDED RELEASE ORAL at 08:59

## 2024-04-25 RX ADMIN — Medication 10 ML: at 00:09

## 2024-04-25 RX ADMIN — Medication 10 ML: at 09:00

## 2024-04-25 NOTE — CONSULTS
Neurology Consult Note    Consult Date: 4/25/2024    Referring MD: Jordi Chung MD    Reason for Consult I have been asked to see the patient in neurological consultation to render advice and opinion regarding right sided numbness and tingling.     Rik Mckinley is a 38 y.o. male with PMH to HLD and mitral valve regurgitation presents to ED c/o right sided tingling and numbness. Symptoms involve his right face, right arm and right leg. He states that started around 2 pm yesterday while he was working and resolved between 10pm and 12 am the same day. He reports numbness and tingling in the right cheek and jaw down the right arm to the 3rd through 5th fingers and down the right leg to the fourth and fifth toes. He denies any weakness, blurred or double vision, difficulty with speech or dysphagia. He denies any trouble with vision, speech or comprehension.     He reported mild frontal pressure headache while in the ED yesterday that has since resolved. He denied associated N/V, photophobia, phonophobia, changes with positioning or bearing down. He states that his mother used to get migraines pretty severely. He states that he is not typically prone to headaches or when he gets them they are mild. However, he has been under a lot of stress at work being a  which he thinks may have a been a provoking factor. He also states that a couple years ago he had a divorce which put him under tremendous stress and he had a panic attack as a result. Had mild diarrheal illness Tuesday which ran through the household. He smokes cigars 2-3 times per week or illicit drug use. He drinks alcohol on weekends.     Past Medical/Surgical Hx:  Past Medical History:   Diagnosis Date    Hyperlipidemia     Mitral valve regurgitation      Past Surgical History:   Procedure Laterality Date    ADENOIDECTOMY         Medications On Admission  Medications Prior to Admission   Medication Sig Dispense Refill Last Dose     "buPROPion XL (WELLBUTRIN XL) 150 MG 24 hr tablet Take 2 tablets by mouth Daily.       metFORMIN (GLUCOPHAGE) 500 MG tablet Take 1 tablet by mouth Daily.          Allergies:  No Known Allergies    Social Hx:  Social History     Socioeconomic History    Marital status:    Tobacco Use    Smoking status: Some Days     Types: Cigars    Smokeless tobacco: Never   Vaping Use    Vaping status: Never Used   Substance and Sexual Activity    Alcohol use: Yes     Comment: occasional    Drug use: No    Sexual activity: Defer       Family Hx:  Family History   Problem Relation Age of Onset    Hypertension Father     Hyperlipidemia Father     Heart failure Maternal Grandfather     Heart attack Maternal Grandfather     Heart disease Maternal Grandfather        Exam    /68 (BP Location: Right arm, Patient Position: Lying)   Pulse 60   Temp 97.5 °F (36.4 °C) (Oral)   Resp 16   Ht 170.2 cm (67\")   Wt 86.2 kg (190 lb)   SpO2 97%   BMI 29.76 kg/m²   gen: NAD, vitals reviewed  MS: oriented x3, recent/remote memory intact, normal attention/concentration, language intact, no neglect, normal fund of knowledge  CN: visual acuity grossly normal, visual fields full, PERRL, EOMI, facial sensation equal, no facial droop, hearing symmetric, palate elevates symmetrically, shoulder shrug equal, tongue midline  Motor: 5/5 throughout upper and lower extremities, normal tone  Sensation: intact to cold temperature throughout  Reflexes: 2+ throughout upper and lower extremities, downgoing plantars, negative lazcano's bilaterally and no clonus  Coordination: no dysmetria with finger to nose or heel to shin bilaterally  Gait: no ataxia, normal station    DATA:    Lab Results   Component Value Date    GLUCOSE 102 (H) 04/25/2024    CALCIUM 8.6 04/25/2024     04/25/2024    K 3.9 04/25/2024    CO2 23.2 04/25/2024     (H) 04/25/2024    BUN 12 04/25/2024    CREATININE 1.11 04/25/2024    EGFRIFAFRI 106 09/21/2018    EGFRIFNONA 88 " 09/21/2018    BCR 10.8 04/25/2024    ANIONGAP 10.8 04/25/2024     Lab Results   Component Value Date    WBC 5.74 04/25/2024    HGB 13.1 04/25/2024    HCT 40.0 04/25/2024    MCV 91.3 04/25/2024     04/25/2024     Lab Results   Component Value Date    LDL 96 04/24/2024    LDL 95 09/21/2018     Lab Results   Component Value Date    HGBA1C 5.70 (H) 04/24/2024     Lab Results   Component Value Date    INR 1.12 (H) 04/24/2024    PROTIME 14.6 (H) 04/24/2024       Lab review: glucose: 102, Cl 108, HgB A1c: 5.70    Imaging review:   Vitamin B12: 505, Folate: 8.38, TSH: 2.140, HgB A1c: 5.70  CTH w/o contrast:  FINDINGS: The brain parenchyma is normal in attenuation. The ventricles  are normal in size. I see no focal mass effect. There is no midline  shift. No extra-axial fluid collections are identified. There is no  evidence of acute intracranial hemorrhage. The calvarium and the skull  base are normal in appearance. The paranasal sinuses and the mastoid air  cells and the middle ear cavities are clear.  IMPRESSION:  1. Normal head CT. The etiology of this patient's right-sided numbness  is not established on this exam.     MRI Brain without contrast:  FINDINGS:    Brain:  Unremarkable.  No mass.  No hemorrhage.  No acute infarct.  The   flow voids at the base of the brain are intact.    Ventricles:  Unremarkable.  No ventriculomegaly.    Bones joints:  Unremarkable.  No acute fracture.    Sinuses: Chronic left ethmoid sinusitis.  No acute sinusitis.    Mastoid air cells:  Unremarkable as visualized.  No mastoid effusion.    Orbits:  Unremarkable as visualized.  IMPRESSION:       Negative MRI of the brain.    ECG: Sinus rhythm, probable left atrial enlargement    Diagnoses:  Right sided numbness  Headache  History of panic attack    Comment: CTH head negative as well as MRI brain without contrast making stroke or TIA less likely. Patient been under lots of stress from work and has history of panic attacks. Mild  headache occurred briefly yesterday in ED and patient has family history of migraines. Symptoms could have potentially been due to atypical migraine with sensory aura and mild headache or due to a panic attack triggered by stress at work. TSH, B12, and folate have been WNL. Ionized calcium ordered to rule out alternative etiologies of right sided numbness and tingling. Aspirin can be discontinued due to TIA being less likely, but educated patient on stroke signs and symptoms.     PLAN:   Ionized calcium  Better management of stress, consider counseling and exercise. Can talk with PCP about potential additional medication if lifestyle modifications do not help. Patient currently on wellbutrin.   If headaches recur or get worse in severity can follow up with neurology clinic outpatient as needed.    Recommendations discussed with Dr. Vela and he is in agreement with plan.

## 2024-04-25 NOTE — PLAN OF CARE
Goal Outcome Evaluation:patient came to hospital/obs unit for eval /tx of right sided numbness. This has completely resolved at the beginning of this shift for this nurse/s assessment per patient verbal statement. Patient was evaluated and treated by neurology while here. Patient states understanding of all discharge instructions. Patient declined assistance dressing and ambulating for discharge and opted to dress self and ambulate ad susie to leave facility for discharge.

## 2024-04-25 NOTE — DISCHARGE INSTRUCTIONS
ER workup has been unremarkable including negative CT and MRI of your brain.  Your lab work shows no evidence of electrolyte imbalance, infection or anemia.  Follow-up with your primary physician for better management of your stress  To the Emergency Department if you develop recurrence of symptoms

## 2024-04-25 NOTE — PLAN OF CARE
Goal Outcome Evaluation:  Plan of Care Reviewed With: patient        Progress: no change    Patient admitted for paresthesia of (right) side of face, and (right) upper and lower extremities.  CT was Neg.   NIH (1)  Currently in MRI, wife in room.

## 2024-04-25 NOTE — ED PROVIDER NOTES
EMERGENCY DEPARTMENT ENCOUNTER  Room Number:  20/20  PCP: Toni Trujillo APRN  Independent Historians: Patient, wife at bedside      HPI:  Chief Complaint: had concerns including Numbness.     A complete HPI/ROS/PMH/PSH/SH/FH are unobtainable due to:   Chronic or social conditions impacting patient care (Social Determinants of Health):       Context: Rik Mckinley is a 38 y.o. male with a medical history of GERD, hyperlipidemia who presents to the ED c/o acute right sided tingling and numbness.  Symptoms involve the right face, right arm and right leg.  Denies any weakness.  He states he just does not feel quite right along the upper right face, right arm from the shoulder down to the hand and right leg involving the lateral portion just below the knee down to the foot.  Denies any trouble with vision, speech or comprehension.  Does report mild headache.  Denies recent illness other than mild amount of diarrhea yesterday which ran through the household.    Patient works as a .  He does not smoke but drinks alcohol on the weekends.  Denies illegal drug use.      Review of prior external notes (non-ED) -and- Review of prior external test results outside of this encounter:   I reviewed prior medical records note patient was seen by primary care provider in October 2023 with fatigue.  Patient does not have significant chronic medical problems.      Prescription drug monitoring program review:         PAST MEDICAL HISTORY  Active Ambulatory Problems     Diagnosis Date Noted    Precordial pain 09/29/2018    Abnormal EKG 09/29/2018    Hyperlipidemia 09/29/2018     Resolved Ambulatory Problems     Diagnosis Date Noted    No Resolved Ambulatory Problems     Past Medical History:   Diagnosis Date    Mitral valve regurgitation          PAST SURGICAL HISTORY  Past Surgical History:   Procedure Laterality Date    ADENOIDECTOMY           FAMILY HISTORY  Family History   Problem Relation Age of Onset     Hypertension Father     Hyperlipidemia Father     Heart failure Maternal Grandfather     Heart attack Maternal Grandfather     Heart disease Maternal Grandfather          SOCIAL HISTORY  Social History     Socioeconomic History    Marital status:    Tobacco Use    Smoking status: Some Days     Types: Cigars    Smokeless tobacco: Never   Vaping Use    Vaping status: Never Used   Substance and Sexual Activity    Alcohol use: Yes     Comment: occasional    Drug use: No    Sexual activity: Defer         ALLERGIES  Patient has no known allergies.      REVIEW OF SYSTEMS  Review of Systems   Constitutional:  Negative for fever.   Respiratory:  Negative for shortness of breath.    Cardiovascular:  Negative for chest pain.   Gastrointestinal:  Positive for diarrhea (Brief episodes of diarrhea yesterday, better today).   Neurological:  Positive for headaches (Mild headache).        Right sided numbness as per HPI   All other systems reviewed and are negative.    Included in HPI  All systems reviewed and negative except for those discussed in HPI.      PHYSICAL EXAM    I have reviewed the triage vital signs and nursing notes.    ED Triage Vitals   Temp Heart Rate Resp BP SpO2   04/24/24 2033 04/24/24 2033 04/24/24 2033 04/24/24 2038 04/24/24 2033   98.4 °F (36.9 °C) 80 18 131/86 99 %      Temp src Heart Rate Source Patient Position BP Location FiO2 (%)   -- -- 04/24/24 2038 04/24/24 2038 --     Sitting Left arm        Physical Exam  GENERAL: Alert and pleasant male in no obvious distress.  Triage vitals reviewed and are benign  SKIN: Warm, dry  HENT: Normocephalic, atraumatic  EYES: no scleral icterus  CV: regular rhythm, regular rate  RESPIRATORY: normal effort, lungs clear  ABDOMEN: soft, nontender, nondistended  MUSCULOSKELETAL: no deformity  NEURO: alert, moves all extremities, follows commands  Strength-intact throughout the face arms and legs  Cognition-intact and normal  Speech-no significant dysarthria or  aphasia  Vision-intact without noted abnormality  Sensation-decreased light touch to the right forehead, right arm and right lower part of the leg laterally  Coordination-intact      LAB RESULTS  Recent Results (from the past 24 hour(s))   Comprehensive Metabolic Panel    Collection Time: 04/24/24  9:02 PM    Specimen: Blood   Result Value Ref Range    Glucose 93 65 - 99 mg/dL    BUN 11 6 - 20 mg/dL    Creatinine 1.02 0.76 - 1.27 mg/dL    Sodium 142 136 - 145 mmol/L    Potassium 3.9 3.5 - 5.2 mmol/L    Chloride 105 98 - 107 mmol/L    CO2 25.0 22.0 - 29.0 mmol/L    Calcium 9.0 8.6 - 10.5 mg/dL    Total Protein 7.6 6.0 - 8.5 g/dL    Albumin 4.6 3.5 - 5.2 g/dL    ALT (SGPT) 29 1 - 41 U/L    AST (SGOT) 21 1 - 40 U/L    Alkaline Phosphatase 76 39 - 117 U/L    Total Bilirubin 0.5 0.0 - 1.2 mg/dL    Globulin 3.0 gm/dL    A/G Ratio 1.5 g/dL    BUN/Creatinine Ratio 10.8 7.0 - 25.0    Anion Gap 12.0 5.0 - 15.0 mmol/L    eGFR 96.5 >60.0 mL/min/1.73   Protime-INR    Collection Time: 04/24/24  9:02 PM    Specimen: Blood   Result Value Ref Range    Protime 14.6 (H) 11.7 - 14.2 Seconds    INR 1.12 (H) 0.90 - 1.10   Single High Sensitivity Troponin T    Collection Time: 04/24/24  9:02 PM    Specimen: Blood   Result Value Ref Range    HS Troponin T 6 <22 ng/L   CBC Auto Differential    Collection Time: 04/24/24  9:02 PM    Specimen: Blood   Result Value Ref Range    WBC 6.89 3.40 - 10.80 10*3/mm3    RBC 4.53 4.14 - 5.80 10*6/mm3    Hemoglobin 13.4 13.0 - 17.7 g/dL    Hematocrit 41.5 37.5 - 51.0 %    MCV 91.6 79.0 - 97.0 fL    MCH 29.6 26.6 - 33.0 pg    MCHC 32.3 31.5 - 35.7 g/dL    RDW 13.5 12.3 - 15.4 %    RDW-SD 45.8 37.0 - 54.0 fl    MPV 9.7 6.0 - 12.0 fL    Platelets 274 140 - 450 10*3/mm3    Neutrophil % 53.0 42.7 - 76.0 %    Lymphocyte % 36.6 19.6 - 45.3 %    Monocyte % 9.4 5.0 - 12.0 %    Eosinophil % 0.3 0.3 - 6.2 %    Basophil % 0.3 0.0 - 1.5 %    Immature Grans % 0.4 0.0 - 0.5 %    Neutrophils, Absolute 3.65 1.70 - 7.00  10*3/mm3    Lymphocytes, Absolute 2.52 0.70 - 3.10 10*3/mm3    Monocytes, Absolute 0.65 0.10 - 0.90 10*3/mm3    Eosinophils, Absolute 0.02 0.00 - 0.40 10*3/mm3    Basophils, Absolute 0.02 0.00 - 0.20 10*3/mm3    Immature Grans, Absolute 0.03 0.00 - 0.05 10*3/mm3    nRBC 0.0 0.0 - 0.2 /100 WBC   ECG 12 Lead Stroke Evaluation    Collection Time: 04/24/24  9:31 PM   Result Value Ref Range    QT Interval 377 ms    QTC Interval 404 ms         RADIOLOGY  No Radiology Exams Resulted Within Past 24 Hours      MEDICATIONS GIVEN IN ER  Medications   aspirin tablet 325 mg (has no administration in time range)         ORDERS PLACED DURING THIS VISIT:  Orders Placed This Encounter   Procedures    CT Head Without Contrast    Comprehensive Metabolic Panel    Protime-INR    Single High Sensitivity Troponin T    CBC Auto Differential    ECG 12 Lead Stroke Evaluation    Initiate ED Observation Status    CBC & Differential         OUTPATIENT MEDICATION MANAGEMENT:  Current Facility-Administered Medications Ordered in Epic   Medication Dose Route Frequency Provider Last Rate Last Admin    aspirin tablet 325 mg  325 mg Oral Once AlphonseKristian lindsay MD         Current Outpatient Medications Ordered in Epic   Medication Sig Dispense Refill    buPROPion XL (WELLBUTRIN XL) 150 MG 24 hr tablet Take 2 tablets by mouth Daily.      metFORMIN (GLUCOPHAGE) 500 MG tablet Take 1 tablet by mouth Daily.           PROCEDURES  Procedures            PROGRESS, DATA ANALYSIS, CONSULTS, AND MEDICAL DECISION MAKING  All labs have been independently interpreted by me.  All radiology studies have been reviewed by me. All EKG's have been independently viewed and interpreted by me.  Discussion below represents my analysis of pertinent findings related to patient's condition, differential diagnosis, treatment plan and final disposition.    Differential diagnosis includes but is not limited to atypical migraine, stroke, tumor.      ED Course as of 04/24/24 9771    Wed Apr 24, 2024   2100 NIH of 1 with right sided sensory deficit.  Not candidate for tPA as symptoms are not disabling.  Not candidate for thrombectomy for same reasons. [DB]   2135 EKG independently interpreted by me    Time 9:31 PM  Sinus 69  Normal P waves and KS intervals  QRS-normal axis, normal QRS  ST, T waves-nonspecific change  No prior to compare [DB]   2135 CT scan of the brain independently interpreted by me shows no obvious acute abnormality. [DB]   2142 Labs reviewed are pretty benign.  CBC is normal without evidence of infection or anemia.    Chemistries do not show evidence of hepatic or renal failure.  Electrolytes benign    High sensitive troponin 6 which go against acute coronary syndrome. [DB]   2145 I discussed head CT with Dr. Richard Harrell who agrees that there is no acute disease on today's imaging. [DB]   2149 I discussed treatment and evaluation this patient with Chel Nieves from the observation unit who admit for further evaluation treatment. [DB]      ED Course User Index  [DB] Kristian Cunha MD             AS OF 21:51 EDT VITALS:    BP - 118/89  HR - 71  TEMP - 98.4 °F (36.9 °C)  O2 SATS - 97%    COMPLEXITY OF CARE  38-year-old male in good general health presents with right sided numbness involving the face arm and leg that started around 2 PM.    Please see ED course for my independent interpretation of ED testing to include EKG, CT head and labs.  ED workup relatively benign without clear evidence for cause of right sided numbness.    Plan admission to the observation unit for further testing to include MRI and possible neurology consult.  Certainly this could be atypical migraine but small stroke, hemorrhage or malignancy are still within the differential diagnosis and MRI is needed for further clarification.    Will give aspirin orally in the case that there is a small stroke.      DIAGNOSIS  Final diagnoses:   Right sided numbness         DISPOSITION  ED Disposition       ED  Disposition   Decision to Admit    Condition   --    Comment   --                Please note that portions of this document were completed with a voice recognition program.    Note Disclaimer: At [facility], we believe that sharing information builds trust and better relationships. You are receiving this note because you recently visited [facility]. It is possible you will see health information before a provider has talked with you about it. This kind of information can be easy to misunderstand. To help you fully understand what it means for your health, we urge you to discuss this note with your provider.         Kristian Cunha MD  04/24/24 3311

## 2024-04-25 NOTE — ED NOTES
"Nursing report ED to floor  Rik Mckinley  38 y.o.  male    HPI :  HPI (Adult)  Stated Reason for Visit: numbness  History Obtained From: patient    Chief Complaint  Chief Complaint   Patient presents with    Numbness       Admitting doctor:   Jordi Chung MD    Admitting diagnosis:   The encounter diagnosis was Right sided numbness.    Code status:   Current Code Status       Date Active Code Status Order ID Comments User Context       Not on file            Allergies:   Patient has no known allergies.    Isolation:   No active isolations    Intake and Output  No intake or output data in the 24 hours ending 04/24/24 2155    Weight:       04/24/24 2039   Weight: 86.2 kg (190 lb)       Most recent vitals:   Vitals:    04/24/24 2033 04/24/24 2038 04/24/24 2039 04/24/24 2143   BP:  131/86  118/89   BP Location:  Left arm     Patient Position:  Sitting     Pulse: 80   71   Resp: 18      Temp: 98.4 °F (36.9 °C)      SpO2: 99%   97%   Weight:   86.2 kg (190 lb)    Height:   170.2 cm (67\")        Active LDAs/IV Access:   Lines, Drains & Airways       Active LDAs       Name Placement date Placement time Site Days    Peripheral IV 04/24/24 2102 Right Antecubital 04/24/24 2102  Antecubital  less than 1                    Labs (abnormal labs have a star):   Labs Reviewed   PROTIME-INR - Abnormal; Notable for the following components:       Result Value    Protime 14.6 (*)     INR 1.12 (*)     All other components within normal limits   SINGLE HS TROPONIN T - Normal    Narrative:     High Sensitive Troponin T Reference Range:  <14.0 ng/L- Negative Female for AMI  <22.0 ng/L- Negative Male for AMI  >=14 - Abnormal Female indicating possible myocardial injury.  >=22 - Abnormal Male indicating possible myocardial injury.   Clinicians would have to utilize clinical acumen, EKG, Troponin, and serial changes to determine if it is an Acute Myocardial Infarction or myocardial injury due to an underlying chronic condition.      "   CBC WITH AUTO DIFFERENTIAL - Normal   COMPREHENSIVE METABOLIC PANEL    Narrative:     GFR Normal >60  Chronic Kidney Disease <60  Kidney Failure <15     CBC AND DIFFERENTIAL    Narrative:     The following orders were created for panel order CBC & Differential.  Procedure                               Abnormality         Status                     ---------                               -----------         ------                     CBC Auto Differential[[id number]]        Normal              Final result                 Please view results for these tests on the individual orders.       EKG:   ECG 12 Lead Stroke Evaluation   Preliminary Result   HEART RATE= 69  bpm   RR Interval= 870  ms   TN Interval= 199  ms   P Horizontal Axis= 20  deg   P Front Axis= 46  deg   QRSD Interval= 86  ms   QT Interval= 377  ms   QTcB= 404  ms   QRS Axis= 6  deg   T Wave Axis= 29  deg   - BORDERLINE ECG -   Sinus rhythm   Probable left atrial enlargement   Electronically Signed By:    Date and Time of Study: 2024-04-24 21:31:04          Meds given in ED:   Medications   aspirin tablet 325 mg (has no administration in time range)       Imaging results:  CT Head Without Contrast    Result Date: 4/24/2024  1. Normal head CT. The etiology of this patient's right-sided numbness is not established on this exam. If there remains any clinical suspicion of acute stroke I recommend an MRI of the brain for more complete assessment. Results and recommendations were discussed with Dr. Cunha in the ER by telephone on 04/24/2024 at 9:40.  Radiation dose reduction techniques were utilized, including automated exposure control and exposure modulation based on body size.        Ambulatory status:   - up ad susie    Social issues:   Social History     Socioeconomic History    Marital status:    Tobacco Use    Smoking status: Some Days     Types: Cigars    Smokeless tobacco: Never   Vaping Use    Vaping status: Never Used   Substance and Sexual  Activity    Alcohol use: Yes     Comment: occasional    Drug use: No    Sexual activity: Defer       Peripheral Neurovascular  Peripheral Neurovascular (Adult)  Peripheral Neurovascular WDL: .WDL except, neurovascular assessment upper, neurovascular assessment lower  LUE Neurovascular Assessment  Temperature LUE: warm  Color LUE: no discoloration  Sensation LUE: no numbness  RUE Neurovascular Assessment  Temperature RUE: warm  Color RUE: no discoloration  Sensation RUE: numbness present  LLE Neurovascular Assessment  Temperature LLE: warm  Color LLE: no discoloration  Sensation LLE: no numbness  RLE Neurovascular Assessment  Temperature RLE: warm  Color RLE: no discoloration  Sensation RLE: numbness present    Neuro Cognitive  Neuro Cognitive (Adult)  Cognitive/Neuro/Behavioral WDL: .WDL except  Additional Documentation: NIH Stroke Scale (group)  Pupils  Pupil PERRLA: yes  NIH Stroke Scale  Interval: baseline  1a. Level of Consciousness: 0-->Alert, keenly responsive  1b. LOC Questions: 0-->Answers both questions correctly  1c. LOC Commands: 0-->Performs both tasks correctly  2. Best Gaze: 0-->Normal  3. Visual: 0-->No visual loss  4. Facial Palsy: 0-->Normal symmetrical movements  5a. Motor Arm, Left: 0-->No drift, limb holds 90 (or 45) degrees for full 10 secs  5b. Motor Arm, Right: 0-->No drift, limb holds 90 (or 45) degrees for full 10 secs  6a. Motor Leg, Left: 0-->No drift, leg holds 30 degree position for full 5 secs  6b. Motor Leg, Right: 0-->No drift, leg holds 30 degree position for full 5 secs  7. Limb Ataxia: 0-->Absent  8. Sensory: 1-->Mild-to-moderate sensory loss, patient feels pinprick is less sharp or is dull on the affected side, or there is a loss of superficial pain with pinprick, but patient is aware of being touched  9. Best Language: 0-->No aphasia, normal  10. Dysarthria: 0-->Normal  11. Extinction and Inattention (formerly Neglect): 0-->No abnormality  Total (NIH Stroke Scale):  1    Learning  Learning Assessment (Adult)  Learning Readiness and Ability: no barriers identified  Education Provided  Person Taught: patient  Teaching Method: verbal instruction  Teaching Focus: symptom/problem overview, diagnostic test, medical device/equipment use  Education Outcome Evaluation: acceptance expressed, verbalizes understanding    Respiratory  Respiratory WDL  Respiratory WDL: WDL    Abdominal Pain       Pain Assessments  Pain (Adult)  (0-10) Pain Rating: Rest: 3    NIH Stroke Scale  NIH Stroke Scale  Interval: baseline  1a. Level of Consciousness: 0-->Alert, keenly responsive  1b. LOC Questions: 0-->Answers both questions correctly  1c. LOC Commands: 0-->Performs both tasks correctly  2. Best Gaze: 0-->Normal  3. Visual: 0-->No visual loss  4. Facial Palsy: 0-->Normal symmetrical movements  5a. Motor Arm, Left: 0-->No drift, limb holds 90 (or 45) degrees for full 10 secs  5b. Motor Arm, Right: 0-->No drift, limb holds 90 (or 45) degrees for full 10 secs  6a. Motor Leg, Left: 0-->No drift, leg holds 30 degree position for full 5 secs  6b. Motor Leg, Right: 0-->No drift, leg holds 30 degree position for full 5 secs  7. Limb Ataxia: 0-->Absent  8. Sensory: 1-->Mild-to-moderate sensory loss, patient feels pinprick is less sharp or is dull on the affected side, or there is a loss of superficial pain with pinprick, but patient is aware of being touched  9. Best Language: 0-->No aphasia, normal  10. Dysarthria: 0-->Normal  11. Extinction and Inattention (formerly Neglect): 0-->No abnormality  Total (NIH Stroke Scale): 1    Nicole Caceres RN  04/24/24 21:55 EDT

## 2024-04-25 NOTE — H&P
[facility]   HISTORY AND PHYSICAL    Patient Name: Rik Mckinley  : 1985  MRN: [id number]  Primary Care Physician:  Toni Trujillo APRN  Date of admission: 2024    Subjective   Subjective     Chief Complaint:   Chief Complaint   Patient presents with    Numbness         HPI:    Rik Mckinley is a 38 y.o. male with a history of hyperlipidemia who presents to [facility] with right sided numbness and tingling.  He states that started this afternoon while he was working.  He reports numbness and tingling in the right cheek and jaw down the right arm to the 3rd through 5th fingers and down the right leg to the fourth and fifth toes.  He reports he has some mild pressure in his head.  He denies any lightheadedness or dizziness.  Denies any blurred or double vision.  Denies any difficulty with his speech or swallowing.  Denies focal weakness.  Denies difficulty walking.  Denies chest pain shortness of breath.  He does report he was feeling a bit ill yesterday felt like he had a fever and had some loose stools but was feeling better today.  He denies any blood in the stool or dark tarry quality to the stool.  Denies any abdominal pain or nausea.  He does report he smokes cigars 2-3 times a week and will drink alcohol on the weekends.    Review of Systems   All systems were reviewed and negative except for: What is mentioned above in the HPI.    Personal History     Past Medical History:   Diagnosis Date    Hyperlipidemia     Mitral valve regurgitation        Past Surgical History:   Procedure Laterality Date    ADENOIDECTOMY         Family History: family history includes Heart attack in his maternal grandfather; Heart disease in his maternal grandfather; Heart failure in his maternal grandfather; Hyperlipidemia in his father; Hypertension in his father. Otherwise pertinent FHx was reviewed and not pertinent to current issue.    Social History:  reports that he has been smoking cigars.  He has never used smokeless tobacco. He reports current alcohol use. He reports that he does not use drugs.    Home Medications:  buPROPion XL and metFORMIN    Allergies:  No Known Allergies    Objective   Objective     Vitals:   Temp:  [98.4 °F (36.9 °C)] 98.4 °F (36.9 °C)  Heart Rate:  [71-80] 71  Resp:  [18] 18  BP: (118-131)/(86-89) 118/89  Physical Exam    Constitutional: 38-year-old male no acute distress on room air   Eyes: PERRLA, sclerae anicteric, no conjunctival injection, EOMI   HENT: NCAT, mucous membranes moist   Neck: Supple, no thyromegaly, no lymphadenopathy, trachea midline   Respiratory: Clear to auscultation bilaterally, nonlabored respirations    Cardiovascular: RRR, no murmurs, rubs, or gallops, palpable pedal pulses bilaterally   Gastrointestinal: Positive bowel sounds, soft, nontender, nondistended   Musculoskeletal: No bilateral ankle edema, no clubbing or cyanosis to extremities   Psychiatric: Appropriate affect, cooperative   Neurologic: Oriented x 3, strength symmetric in all extremities, Cranial Nerves grossly intact to confrontation, speech clear no facial droop.  Equal  strength bilaterally.  No pronator drift.  Finger-to-nose heel-to-shin exam normal, some decreased sensation to the right side at the forehead, cheek,neck, upper and lower extremities   Skin: No rashes     Result Review    Result Review:  I have personally reviewed the results from the time of this admission to 4/24/2024 22:08 EDT and agree with these findings:  [x]  Laboratory list / accordion  []  Microbiology  [x]  Radiology  [x]  EKG/Telemetry   []  Cardiology/Vascular   []  Pathology  [x]  Old records  []  Other:  Most notable findings include:     CT Head Without Contrast    Result Date: 4/24/2024  EMERGENCY CT SCAN OF THE HEAD WITHOUT CONTRAST ON 04/24/2024  CLINICAL HISTORY: Patient has right-sided numbness.  TECHNIQUE: Spiral CT images were obtained from the base of the skull to the vertex without  intravenous contrast. The images were reformatted and submitted in 3 mm thick axial, sagittal and coronal CT sections with brain algorithm.  COMPARISON: There are no prior head CTs from [facility] for comparison.  FINDINGS: The brain parenchyma is normal in attenuation. The ventricles are normal in size. I see no focal mass effect. There is no midline shift. No extra-axial fluid collections are identified. There is no evidence of acute intracranial hemorrhage. The calvarium and the skull base are normal in appearance. The paranasal sinuses and the mastoid air cells and the middle ear cavities are clear.      1. Normal head CT. The etiology of this patient's right-sided numbness is not established on this exam. If there remains any clinical suspicion of acute stroke I recommend an MRI of the brain for more complete assessment. Results and recommendations were discussed with Dr. Cunha in the ER by telephone on 04/24/2024 at 9:40.  Radiation dose reduction techniques were utilized, including automated exposure control and exposure modulation based on body size.            Assessment & Plan   Assessment / Plan     Brief Patient Summary:  Rik Mckinley is a 38 y.o. male who is being admitted the observation unit for further evaluation of right sided numbness and tingling.  In the ER, CT head without was normal with no intracranial findings.  Lab work fairly unremarkable.  Patient given full dose aspirin.  Will obtain an MRI of the brain and further lab work for evaluation.  Neurology consulted.    Active Hospital Problems:  Active Hospital Problems    Diagnosis     **Right sided numbness      Plan:     Right-sided numbness  -CT head negative acute  -MRI brain ordered  -B12, folate, TSH, A1c, lipid panel ordered  -Neurology consulted  -Aspirin/statin  -Neurochecks every 4 hours  -Telemetry monitoring    Hyperlipidemia  -Lipid panel pending        DVT prophylaxis:  Mechanical DVT prophylaxis orders are  present.        CODE STATUS:    Code Status (Patient has no pulse and is not breathing): CPR (Attempt to Resuscitate)  Medical Interventions (Patient has pulse or is breathing): Full Support    Admission Status:  I believe this patient meets observation status.    76 minutes have been spent by [facility] providers in the care of this patient while under observation status.      Appropriate PPE worn during patient encounter.  Hand hygeine performed before and after seeing the patient.      Electronically signed by Lula Nieves PA-C, 04/24/24, 10:08 PM EDT.

## 2024-04-25 NOTE — PROGRESS NOTES
.ED OBSERVATION PROGRESS/DISCHARGE SUMMARY    Date of Admission: 4/24/2024   LOS: 0 days   PCP: Toni Trujillo APRN    Subjective     Hospital Outcome:   38-year-old male seen examined at bedside following admission to observation unit due to right-sided numbness and tingling.  Laboratory evaluation relatively unremarkable.  CT head and MRI brain without negative acute.  Neurology evaluated patient and has concluded that patient's symptoms are less likely due to stroke or TIA and most likely due to stress from work that he has been dealing with recently.  Recommendation for better management of the stress was made by neurology in addition to counseling and exercise.  TSH, B12, folate and ionized calcium WNL.  Patient cleared for discharge from neurostanoint and he will need to follow-up with his PCP.    ROS:  General: no fevers, chills  Respiratory: no cough, dyspnea  Cardiovascular: no chest pain, palpitations  Abdomen: No abdominal pain, nausea, vomiting, or diarrhea  Neurologic: No focal weakness    Objective   Physical Exam:  I have reviewed the vital signs.  Temp:  [97.5 °F (36.4 °C)-98.4 °F (36.9 °C)] 97.5 °F (36.4 °C)  Heart Rate:  [60-80] 60  Resp:  [16-18] 16  BP: (110-131)/(68-89) 115/68  General Appearance:    Alert, cooperative, no distress  Head:    Normocephalic, atraumatic  Eyes:    Sclerae anicteric  Neck:   Supple, no mass  Lungs: Clear to auscultation bilaterally, respirations unlabored  Heart: Regular rate and rhythm, S1 and S2 normal, no murmur, rub or gallop  Abdomen:  Soft, non-tender, bowel sounds active, nondistended  Extremities: No clubbing, cyanosis, or edema to lower extremities  Pulses:  2+ and symmetric in distal lower extremities  Skin: No rashes   Neurologic: Oriented x3, Normal strength to extremities    Results Review:    I have reviewed the labs, radiology results and diagnostic studies.    Results from last 7 days   Lab Units 04/25/24  0433   WBC 10*3/mm3 5.74   HEMOGLOBIN  g/dL 13.1   HEMATOCRIT % 40.0   PLATELETS 10*3/mm3 264     Results from last 7 days   Lab Units 04/25/24  0404 04/24/24  2102   SODIUM mmol/L 142 142   POTASSIUM mmol/L 3.9 3.9   CHLORIDE mmol/L 108* 105   CO2 mmol/L 23.2 25.0   BUN mg/dL 12 11   CREATININE mg/dL 1.11 1.02   CALCIUM mg/dL 8.6 9.0   BILIRUBIN mg/dL  --  0.5   ALK PHOS U/L  --  76   ALT (SGPT) U/L  --  29   AST (SGOT) U/L  --  21   GLUCOSE mg/dL 102* 93     Imaging Results (Last 24 Hours)       Procedure Component Value Units Date/Time    CT Head Without Contrast [[id number]] Collected: 04/24/24 2154     Updated: 04/25/24 0726    Narrative:      EMERGENCY CT SCAN OF THE HEAD WITHOUT CONTRAST ON 04/24/2024     CLINICAL HISTORY: Patient has right-sided numbness.     TECHNIQUE: Spiral CT images were obtained from the base of the skull to  the vertex without intravenous contrast. The images were reformatted and  submitted in 3 mm thick axial, sagittal and coronal CT sections with  brain algorithm.     COMPARISON: There are no prior head CTs from [facility] for comparison.     FINDINGS: The brain parenchyma is normal in attenuation. The ventricles  are normal in size. I see no focal mass effect. There is no midline  shift. No extra-axial fluid collections are identified. There is no  evidence of acute intracranial hemorrhage. The calvarium and the skull  base are normal in appearance. The paranasal sinuses and the mastoid air  cells and the middle ear cavities are clear.       Impression:      1. Normal head CT. The etiology of this patient's right-sided numbness  is not established on this exam. If there remains any clinical suspicion  of acute stroke I recommend an MRI of the brain for more complete  assessment. Results and recommendations were discussed with Dr. Cunha  in the ER by telephone on 04/24/2024 at 9:40.      Radiation dose reduction techniques were utilized, including automated  exposure control and exposure modulation  based on body size.        This report was finalized on 4/25/2024 7:23 AM by Dr. Osvaldo Harrell M.D  on Workstation: [id number]       MRI Brain Without Contrast [[id number]] Collected: 04/25/24 0032     Updated: 04/25/24 0032    Narrative:        Patient: CHAPIS FREITAS  Time Out: 00:32  Exam(s): MRI HEAD Without Contrast     EXAM:    MR Head Without Intravenous Contrast    CLINICAL HISTORY:     Reason for exam: Right sided numbness.    TECHNIQUE:    Magnetic resonance images of the head brain without intravenous   contrast in multiple planes.    COMPARISON:  Comparison made to prior head CT from April 24, 2024.    FINDINGS:    Brain:  Unremarkable.  No mass.  No hemorrhage.  No acute infarct.  The   flow voids at the base of the brain are intact.    Ventricles:  Unremarkable.  No ventriculomegaly.    Bones joints:  Unremarkable.  No acute fracture.    Sinuses: Chronic left ethmoid sinusitis.  No acute sinusitis.    Mastoid air cells:  Unremarkable as visualized.  No mastoid effusion.    Orbits:  Unremarkable as visualized.    IMPRESSION:       Negative MRI of the brain.      Impression:          Electronically signed by India Jaquez MD on 04-25-24 at 0032            I have reviewed the medications.  ---------------------------------------------------------------------------------------------  Assessment & Plan   Assessment/Problem List    Right sided numbness      Plan:    Right-sided numbness  -CT head negative acute  -MRI brain shows no evidence of acute intracranial findings  -B12, folate, TSH, A1c, lipid panel ordered  -Neurology consulted  -Aspirin/statin  -Neurochecks every 4 hours  -Telemetry monitoring     Hyperlipidemia  -Lipid panel pending     Disposition: Home    Follow-up after Discharge: PCP    This note will serve as a discharge summary    AMIE Saavedra 04/25/24 07:47 EDT    I have worn appropriate PPE during this patient encounter, sanitized my hands both with entering and exiting  patient's room.      58 minutes has been spent by [facility] providers in the care of this patient while under observation status

## 2024-04-25 NOTE — PROGRESS NOTES
KIMBERLY MACIAS Attestation Note    I supervised care provided by the midlevel provider.    The FORD and I have discussed this patient's history, physical exam, and treatment plan. I have reviewed the documentation and personally had a face to face interaction with the patient  I affirm the documentation and agree with the treatment and plan. I provided a substantive portion of the care of this patient.  I personally performed the physical exam, in its entirety.  My personal findings are documented in below:    History:  38-year-old male reports transient episode of tingling right hand and the corner of his right mouth yesterday with symptoms complete resolved.  No headache with that though he felt like his head was heavy.  All symptoms completely resolved he feels back to baseline at this time.    Physical Exam:  General: No acute distress.  HENT: NCAT, PERRL, Nares patent.  Face symmetric  Eyes: no scleral icterus.  Neck: trachea midline, no ROM limitations.  CV: Pink warm and well-perfused throughout.  Regular  Respiratory: No distress or increased work of breathing.  Clear  Abdomen: soft, no focal tenderness or rigidity  Musculoskeletal: no deformity.  Neuro: alert, moves all extremities, follows commands.  Speech fluent  Skin: warm, dry.    Assessment and Plan:  Imaging unremarkable for acute pathology.  Await neurology consultation for discharge planning.